# Patient Record
Sex: FEMALE | Race: WHITE | Employment: UNEMPLOYED | URBAN - METROPOLITAN AREA
[De-identification: names, ages, dates, MRNs, and addresses within clinical notes are randomized per-mention and may not be internally consistent; named-entity substitution may affect disease eponyms.]

---

## 2020-10-20 ENCOUNTER — HOSPITAL ENCOUNTER (EMERGENCY)
Facility: CLINIC | Age: 35
Discharge: HOME | End: 2020-10-20
Admitting: NURSE PRACTITIONER

## 2020-10-20 VITALS
HEART RATE: 97 BPM | SYSTOLIC BLOOD PRESSURE: 127 MMHG | DIASTOLIC BLOOD PRESSURE: 85 MMHG | HEIGHT: 64 IN | OXYGEN SATURATION: 100 % | TEMPERATURE: 98.5 F | RESPIRATION RATE: 20 BRPM | BODY MASS INDEX: 32.78 KG/M2 | WEIGHT: 192 LBS
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
## 2020-10-20 DIAGNOSIS — S29.9XXA TRAUMATIC INJURY OF RIB: ICD-10-CM

## 2020-10-20 PROCEDURE — 99283 EMERGENCY DEPT VISIT LOW MDM: CPT | Performed by: NURSE PRACTITIONER

## 2020-10-20 PROCEDURE — 99284 EMERGENCY DEPT VISIT MOD MDM: CPT | Performed by: NURSE PRACTITIONER

## 2020-10-20 PROCEDURE — 250N000013 HC RX MED GY IP 250 OP 250 PS 637: Performed by: NURSE PRACTITIONER

## 2020-10-20 RX ORDER — OXYCODONE AND ACETAMINOPHEN 5; 325 MG/1; MG/1
2 TABLET ORAL ONCE
Status: COMPLETED | OUTPATIENT
Start: 2020-10-20 | End: 2020-10-20

## 2020-10-20 RX ORDER — ACETAMINOPHEN 500 MG
1000 TABLET ORAL EVERY 6 HOURS PRN
COMMUNITY

## 2020-10-20 RX ADMIN — OXYCODONE HYDROCHLORIDE AND ACETAMINOPHEN 2 TABLET: 5; 325 TABLET ORAL at 01:27

## 2020-10-20 SDOH — HEALTH STABILITY: MENTAL HEALTH: HOW OFTEN DO YOU HAVE A DRINK CONTAINING ALCOHOL?: NEVER

## 2020-10-20 ASSESSMENT — ENCOUNTER SYMPTOMS
PALPITATIONS: 0
GASTROINTESTINAL NEGATIVE: 1
FEVER: 0
DECREASED CONCENTRATION: 0
HEMATOLOGIC/LYMPHATIC NEGATIVE: 1
APNEA: 0

## 2020-10-20 ASSESSMENT — MIFFLIN-ST. JEOR: SCORE: 1550.91

## 2020-10-20 NOTE — ED PROVIDER NOTES
"  History     Chief Complaint   Patient presents with     Rib Pain     HPI  Lindsey Schaefer is a 35 year old female who who presents to the emergency department reports she was working with her horse when the horse kicked her in the right mid ribs.  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  Reports the incident happened around 10 PM this evening.  She denies loss of consciousness, unusual cough, difficulty breathing, no gross hematuria when she urinated.  Not noted any flank bruising or swelling.    Allergies:  Allergies   Allergen Reactions     Nsaids Unknown       Problem List:    There are no active problems to display for this patient.       Past Medical History:    History reviewed. No pertinent past medical history.    Past Surgical History:    Past Surgical History:   Procedure Laterality Date     ABDOMEN SURGERY         Family History:    History reviewed. No pertinent family history.    Social History:  Marital Status:  Single [1]  Social History     Tobacco Use     Smoking status: Current Some Day Smoker     Smokeless tobacco: Never Used   Substance Use Topics     Alcohol use: Never     Frequency: Never     Drug use: Never        Medications:         acetaminophen (TYLENOL) 500 MG tablet          Review of Systems   Constitutional: Negative for fever.   Respiratory: Negative for apnea.    Cardiovascular: Negative for palpitations.   Gastrointestinal: Negative.    Skin: Negative.    Neurological: Negative for syncope.   Hematological: Negative.    Psychiatric/Behavioral: Negative for decreased concentration.       Physical Exam   BP: 127/85  Pulse: 97  Temp: 98.5  F (36.9  C)  Resp: 20  Height: 162.6 cm (5' 4\")  Weight: 87.1 kg (192 lb)  SpO2: 100 %      Physical Exam  Vitals signs and nursing note reviewed.   Constitutional:       Appearance: Normal appearance.   HENT:      Head: Normocephalic and atraumatic.   Cardiovascular:    "   Rate and Rhythm: Normal rate and regular rhythm.      Heart sounds: Normal heart sounds.   Pulmonary:      Effort: Pulmonary effort is normal.      Breath sounds: Normal breath sounds.   Chest:       Abdominal:      General: Bowel sounds are normal.      Palpations: Abdomen is soft.      Tenderness: There is no abdominal tenderness. There is no right CVA tenderness or left CVA tenderness.   Skin:     General: Skin is warm and dry.      Capillary Refill: Capillary refill takes less than 2 seconds.      Comments: Patient has multiple scars on her back 1 to the upper right scapula another down at the mid lumbar spine consistent with surgical scars   Neurological:      General: No focal deficit present.      Mental Status: She is alert.   Psychiatric:         Mood and Affect: Mood normal.         Behavior: Behavior normal.         ED Course  Patient has refused urinalysis in addition to rib series she reports she does not have insurance at this time she just wondered her lung sounds auscultated.  She is asking for pain medication as she has been taking Tylenol and she reports she is allergic to NSAIDs.  She reports when she was in her 20s she did have a seizure from tramadol.  In such we will give her 2 Percocet here in the ED.  I have instructed to the patient that I advise an x-ray if she is having any difficulty with breathing, any significant contusion noted or dependent bruising in the morning.  Verbalized understanding and discharged home        Procedures               No results found for this or any previous visit (from the past 24 hour(s)).    Medications   oxyCODONE-acetaminophen (PERCOCET) 5-325 MG per tablet 2 tablet (has no administration in time range)       Assessments & Plan (with Medical Decision Making)     I have reviewed the nursing notes.    I have reviewed the findings, diagnosis, plan and need for follow up with the patient.    New Prescriptions    No medications on file       Final diagnoses:    Traumatic injury of rib   Struck by horse       10/20/2020   [facility] EMERGENCY DEPT     Tavia Cervantes, HOLLY CNP  10/20/20 0126

## 2020-10-20 NOTE — ED AVS SNAPSHOT
[facility] Emergency Dept  [address] [place] [address]  Phone: [phone]  Fax: [phone]                                    Lindsey Schaefer   MRN: [id number]    Department: [facility] Emergency Dept   Date of Visit: 10/20/2020           After Visit Summary Signature Page    I have received my discharge instructions, and my questions have been answered. I have discussed any challenges I see with this plan with the nurse or doctor.    ..........................................................................................................................................  Patient/Patient Representative Signature      ..........................................................................................................................................  Patient Representative Print Name and Relationship to Patient    ..................................................               ................................................  Date                                   Time    ..........................................................................................................................................  Reviewed by Signature/Title    ...................................................              ..............................................  Date                                               Time          22EPIC Rev 08/18